# Patient Record
Sex: FEMALE | Race: WHITE | NOT HISPANIC OR LATINO
[De-identification: names, ages, dates, MRNs, and addresses within clinical notes are randomized per-mention and may not be internally consistent; named-entity substitution may affect disease eponyms.]

---

## 2019-05-30 PROBLEM — Z00.00 ENCOUNTER FOR PREVENTIVE HEALTH EXAMINATION: Status: ACTIVE | Noted: 2019-05-30

## 2019-05-31 ENCOUNTER — APPOINTMENT (OUTPATIENT)
Dept: OTOLARYNGOLOGY | Facility: CLINIC | Age: 84
End: 2019-05-31
Payer: MEDICARE

## 2019-05-31 DIAGNOSIS — E04.2 NONTOXIC MULTINODULAR GOITER: ICD-10-CM

## 2019-05-31 DIAGNOSIS — Z78.9 OTHER SPECIFIED HEALTH STATUS: ICD-10-CM

## 2019-05-31 DIAGNOSIS — Z87.891 PERSONAL HISTORY OF NICOTINE DEPENDENCE: ICD-10-CM

## 2019-05-31 PROCEDURE — 99204 OFFICE O/P NEW MOD 45 MIN: CPT

## 2019-05-31 RX ORDER — MELATONIN 10 MG
500-600 TABLET, SUBLINGUAL SUBLINGUAL
Refills: 0 | Status: ACTIVE | COMMUNITY

## 2019-05-31 RX ORDER — DOCUSATE SODIUM 100 MG/1
TABLET ORAL
Refills: 0 | Status: ACTIVE | COMMUNITY

## 2019-05-31 RX ORDER — ASPIRIN 81 MG
TABLET,CHEWABLE ORAL
Refills: 0 | Status: ACTIVE | COMMUNITY

## 2019-05-31 RX ORDER — GABAPENTIN 100 MG/1
100 CAPSULE ORAL
Refills: 0 | Status: ACTIVE | COMMUNITY

## 2019-05-31 RX ORDER — SIMVASTATIN 20 MG/1
20 TABLET, FILM COATED ORAL
Refills: 0 | Status: ACTIVE | COMMUNITY

## 2019-05-31 RX ORDER — PSYLLIUM HUSK 0.4 G
CAPSULE ORAL
Refills: 0 | Status: ACTIVE | COMMUNITY

## 2019-06-02 NOTE — CONSULT LETTER
[Dear  ___] : Dear  [unfilled], [Consult Closing:] : Thank you very much for allowing me to participate in the care of this patient.  If you have any questions, please do not hesitate to contact me. [Courtesy Letter:] : I had the pleasure of seeing your patient, [unfilled], in my office today. [DrNhan  ___] : Dr. THORPE [FreeTextEntry3] : Abad Boogie MD\par Department of Otolaryngology - Head and Neck Surgery\par [facility] [Sincerely,] : Sincerely,

## 2019-06-02 NOTE — PHYSICAL EXAM
[de-identified] : flat, soft, no masses/lesions [Midline] : trachea located in midline position [Normal] : orientation to person, place, and time: normal

## 2019-06-02 NOTE — HISTORY OF PRESENT ILLNESS
[de-identified] : 87F here for initial evaluation.\par \par Referred for evaluation after thyroid sonogram.\par She has no symptoms referable to her thyroid - there is no difficulty eating, breathing swallowing or talking, no voice change or weight loss.She is clinically euthyroid. There is no family h/o thyroid disease.\par \par Thyroid Sonogram 5/20/2019:\par R lobe: 4.4x1.4x2.1cm, mildly increased vascularity; 6mm upper pole hypoechoic module w microcalcifications; 1.6cm midpole hypoechoic nodule w microcalcifications; 1.2cm lower pole isoechoic lesion\par L lobe: 4.3x1.3x1.9cm, nodular w mildly increased vascularity; 5mm upper pole hypoechoic nodule; 1.1cm midpole hypoechoic nodule w microcalcifications; 1.1cm lower pole hypoechoic nodule; 1cm lower pole hypoechoic nodule w calcifications\par Isthmus: 0.2cm; 7mm hypoechoic right sided nodule\par No regional adenopathy\par \par ROS otherwise unremarkable.

## 2019-06-02 NOTE — REVIEW OF SYSTEMS
[As Noted in HPI] : as noted in HPI [Patient Intake Form Reviewed] : Patient intake form was reviewed [Negative] : Endocrine

## 2019-06-02 NOTE — ASSESSMENT
[FreeTextEntry1] : 87F here for initial evaluation. She was referred for evaluation after thyroid sonogram showed incidental nodules. She has no symptoms referable to her thyroid - there is no difficulty eating, breathing swallowing or talking; there is no voice change or weight loss. She is clinically euthyroid and there is no family h/o thyroid disease.\par Thyroid sonogram shows multinodular thyroid gland with multiple hypoechoic nodules >1cm with microcalcifications. Complete and comprehensive head and neck exam is otherwise unremarkable. I will send for FNA of the three suspicious nodules that meet criteria for sampling. RTO after FNA to review results. All questions answered.

## 2019-06-13 ENCOUNTER — RESULT REVIEW (OUTPATIENT)
Age: 84
End: 2019-06-13

## 2019-06-13 ENCOUNTER — APPOINTMENT (OUTPATIENT)
Dept: ULTRASOUND IMAGING | Facility: HOSPITAL | Age: 84
End: 2019-06-13
Payer: MEDICARE

## 2019-06-13 ENCOUNTER — OUTPATIENT (OUTPATIENT)
Dept: OUTPATIENT SERVICES | Facility: HOSPITAL | Age: 84
LOS: 1 days | End: 2019-06-13
Payer: MEDICARE

## 2019-06-13 PROCEDURE — 10006 FNA BX W/US GDN EA ADDL: CPT

## 2019-06-13 PROCEDURE — 88173 CYTOPATH EVAL FNA REPORT: CPT

## 2019-06-13 PROCEDURE — 10005 FNA BX W/US GDN 1ST LES: CPT

## 2019-06-13 PROCEDURE — 88305 TISSUE EXAM BY PATHOLOGIST: CPT

## 2019-06-17 LAB
NON-GYNECOLOGICAL CYTOLOGY STUDY: SIGNIFICANT CHANGE UP

## 2019-06-27 ENCOUNTER — APPOINTMENT (OUTPATIENT)
Dept: OTOLARYNGOLOGY | Facility: CLINIC | Age: 84
End: 2019-06-27

## 2021-11-18 NOTE — REASON FOR VISIT
Daily Progress Note        CODE STATUS:  Full Code    11/17/21  Assessment/Plan:  Tessa Edwards is a 68 year old female admitted on 11/16/2021.  Patient has a past medical history including anemia, sciatica, restless leg syndrome, cervical spine stenosis, GERD, fibromyalgia, osteoarthritis, vitamin D deficiency and mood disorder. She is admitted to the hospital for right colectomy after being found to have incidental finding of colon mass while being prepped for a spinal surgery.  On 11/16/2021 underwent right colectomy.     Colon mass   -- Status post laparoscopic right colectomy 11/16/2021  -- Postop care per colorectal surgery     Acute postoperative respiratory insufficiency  -- Wean O2 as able     Anemia  -- Hemoglobin currently 11.4.  Baseline appears to be 9-11 range  -- No active source of bleeding  --Leucocytosis and thrombocytopenia noted. Has a h/o atypical lymphoproliferative disorder per patient. Follows up with Dr Hill     Restless leg syndrome  -- Continue Requip     Vitamin D deficiency  --Continue vitamin D     Mood disorder  -- Continue Prozac     GERD  --Continue omeprazole     Fibromyalgia  -- Cont home meds     LOS: 1 day     Subjective:  Interval History: Patient seen and examined this morning. She reported to me that she has had a small BM this morning. Abdominal pain is better. Denies any nausea or vomiting. Tolerating full liquids.     Review of Systems:   As mentioned in subjective.    Patient Active Problem List   Diagnosis     Primary localized osteoarthrosis, lower leg     Lymphoproliferative disorder (H)     Anemia, iron deficiency     Restless legs syndrome     Abnormal CT of the abdomen     Chronic low back pain without sciatica, unspecified back pain laterality     Thyroid nodule     Splenomegaly     Weight loss     Colonic mass       Scheduled Meds:    acetaminophen  975 mg Oral Q8H     calcium carbonate-vitamin D  1 tablet Oral QPM     enoxaparin ANTICOAGULANT  40 mg  "Subcutaneous Q24H     FLUoxetine  40 mg Oral Daily     gabapentin  100 mg Oral TID     omeprazole  20 mg Oral At Bedtime     rOPINIRole  0.25 mg Oral At Bedtime     sodium chloride (PF)  3 mL Intracatheter Q8H     Continuous Infusions:    bupivacaine liposome (EXPAREL) LA inj was given in the infiltration site to produce post-op analgesia. Duration of action is up to 72 hours. Other \"itmo\" meds should not be given for 96 hours except for lidocaine 4% patch. This is for INFORMATION ONLY.       lactated ringers 75 mL/hr at 11/17/21 0642     PRN Meds:.[START ON 11/19/2021] acetaminophen, bupivacaine liposome (EXPAREL) LA inj was given in the infiltration site to produce post-op analgesia. Duration of action is up to 72 hours. Other \"timo\" meds should not be given for 96 hours except for lidocaine 4% patch. This is for INFORMATION ONLY., HYDROmorphone **OR** HYDROmorphone, lidocaine 4%, lidocaine (buffered or not buffered), naloxone **OR** naloxone **OR** naloxone **OR** naloxone, ondansetron **OR** ondansetron, sodium chloride (PF)    Objective:  Vital signs in last 24 hours:  Temp:  [97.5  F (36.4  C)-99.6  F (37.6  C)] 97.8  F (36.6  C)  Pulse:  [77-85] 80  Resp:  [16-18] 17  BP: (137-150)/(63-71) 150/71  FiO2 (%):  [2 %] 2 %  SpO2:  [80 %-95 %] 92 %        Intake/Output Summary (Last 24 hours) at 11/17/2021 1437  Last data filed at 11/17/2021 1429  Gross per 24 hour   Intake 3435 ml   Output 1465 ml   Net 1970 ml       Physical Exam:    General: Not in obvious distress.  HEENT: NC, AT   Chest: Clear to auscultation bilaterally  Heart: S1S2 normal, regular. No M/R/G  Abdomen: Soft. Expected tenderness. Bowel sounds- active.  Extremities: No legs swelling  Neuro: alert and awake, grossly non-focal      Lab Results:(I have personally reviewed the results)    Recent Results (from the past 24 hour(s))   Glucose by meter    Collection Time: 11/18/21  8:13 AM   Result Value Ref Range    GLUCOSE BY METER POCT 104 (H) 70 - " 99 mg/dL   Basic metabolic panel    Collection Time: 11/18/21 10:13 AM   Result Value Ref Range    Sodium 139 136 - 145 mmol/L    Potassium 3.7 3.5 - 5.0 mmol/L    Chloride 106 98 - 107 mmol/L    Carbon Dioxide (CO2) 25 22 - 31 mmol/L    Anion Gap 8 5 - 18 mmol/L    Urea Nitrogen 9 8 - 22 mg/dL    Creatinine 0.59 (L) 0.60 - 1.10 mg/dL    Calcium 8.8 8.5 - 10.5 mg/dL    Glucose 128 (H) 70 - 125 mg/dL    GFR Estimate >90 >60 mL/min/1.73m2   CBC with platelets    Collection Time: 11/18/21 10:13 AM   Result Value Ref Range    WBC Count 35.3 (H) 4.0 - 11.0 10e3/uL    RBC Count 3.86 3.80 - 5.20 10e6/uL    Hemoglobin 10.2 (L) 11.7 - 15.7 g/dL    Hematocrit 34.4 (L) 35.0 - 47.0 %    MCV 89 78 - 100 fL    MCH 26.4 (L) 26.5 - 33.0 pg    MCHC 29.7 (L) 31.5 - 36.5 g/dL    RDW 20.4 (H) 10.0 - 15.0 %    Platelet Count 126 (L) 150 - 450 10e3/uL       All laboratory and imaging data in the past 24 hours reviewed  Serum Glucose range:   Recent Labs   Lab 11/18/21  1013 11/18/21  0813 11/17/21  0729 11/17/21  0516   * 104* 104 98     ABG: No lab results found in last 7 days.  CBC:   Recent Labs   Lab 11/18/21  1013 11/17/21  0729 11/16/21  1031   WBC 35.3* 23.6*  --    HGB 10.2* 9.7* 11.4*   HCT 34.4* 32.6*  --    MCV 89 88  --    * 123*  --      Chemistry:   Recent Labs   Lab 11/18/21  1013 11/17/21  0729 11/16/21  1030    139 139   POTASSIUM 3.7 3.8 4.0   CHLORIDE 106 107 106   CO2 25 26 23   BUN 9 11 15   CR 0.59* 0.68 0.73   GFRESTIMATED >90 90 85   KAYLAN 8.8 8.4* 9.1     Coags:  No results for input(s): INR, PROTIME, PTT in the last 168 hours.    Invalid input(s): APTT  Cardiac Markers:  No results for input(s): CKTOTAL, TROPONINI in the last 168 hours.       XR Abdomen Port 1 View    Result Date: 11/16/2021  EXAM: XR ABDOMEN PORT 1 VIEWS LOCATION: [facility] DATE/TIME: 11/16/2021 6:28 PM INDICATION: instrument count- more instruments than what was counted COMPARISON: CT 08/30/2021      IMPRESSION: Staple line seen in the right midabdomen from recent right hemicolectomy. Clips again noted from prior cholecystectomy. No other foreign bodies. Findings called by the undersigned to the OR staff at 1850.    PET Oncology Whole Body    Result Date: 10/21/2021  Combined Report of: PET-CT on  10/21/2021 3:17 PM: 1. PET of the neck, chest, abdomen, and pelvis. 2. PET-CT Fusion for Attenuation Correction and Anatomical Localization.  3. CT of the chest, abdomen and pelvis obtained for attenuation correction. 4. 3D MIP and PET-CT fused images were processed on an independent workstation and archived to PACS and reviewed by a radiologist. Technique: 1. PET: The patient received 12.3 mCi of 18F-FDG. Serum glucose was 123 mg/dL prior to administration. Body weight was 95.7 kg. Images were evaluated in the axial, sagittal and coronal planes as well as the rotational whole body MIP. Images were acquired from the vertex to the feet. Uptake was measured at 64 minutes. BACKGROUND: Liver SUV max= 4.2; Aorta Blood SUV Max: 2.6. 2. CT: Volumetric acquisition of the chest, abdomen and pelvis acquired at 3 mm sections without intravenous contrast. The chest, abdomen and pelvis were evaluated at 5 mm sections in bone, soft tissue and lung windows.  3. 3D MIP and PET-CT fused images were processed on an independent workstation and archived to PACS and reviewed by a radiologist. INDICATION: Lymphadenopathy COMPARISON: CT dated 6/11/2021.. FINDINGS: HEAD/NECK: Right thyroid uptake measuring SUV max 11.7. Bilateral palatine tonsil uptake measuring up to SUV max 12.2. There is mild fullness of the palatine tonsils on CT images. The paranasal sinuses are clear. Mastoid air cells are clear. No masses, mass effect or pathologically enlarged lymph nodes. CHEST: Numerous prominent mildly hypermetabolic mediastinal lymph nodes are mildly increased since 6/11/2021. For example a right low paratracheal lymph node measuring 1.5 x 1.2  cm with SUV max 2.9 (series 3, image 177), previously 1.4 x 1.1 cm. Central tracheal brachial tree is patent. Heart size is normal. No pericardial effusion. No pleural effusion or pneumothorax. Multiple solid pulmonary nodules, including in the left lower lobe measuring 3 mm (series 3 image 208) is unchanged and too small to characterize by PET. New scattered subpleural groundglass opacities without significant FDG avidity, favor atelectasis. ABDOMEN AND PELVIS: Increased hazy attenuation in the central mesentery with generalized low levels of uptake. There are mesenteric and retroperitoneal lymphadenopathy demonstrating mild abnormal FDG uptake, mildly worsened since 6/11/2021. For example in the central mesentery a 1.8 x 1.6 cm lymph node with SUV max 4.3 (series 3, image 329), which previously measured 1.7 x 0.9 cm. In the inferior left peripancreatic space, there is a larger 1.7 x 0.9 cm lymph node with SUV max 3.4 (series 3, image 313), previously 1.4 x 0.9 cm. Mildly increased splenomegaly measuring 18.5 cm in craniocaudal dimension, previously 17.2 cm on 6/11/2021. There is mild generalized uptake throughout the spleen. Nonspecific FDG uptake to the bowel. For example to the cecum with SUV max 6.6 (series 5, image 380), and to the proximal ascending colon with SUV max 10.7 (series 5, image 365). No bowel obstruction. No suspicious hepatic lesions. Hepatic cyst in the right hepatic lobe measuring 1.4 cm. Cholecystectomy. No pancreas masses or ductal dilatation. Increased pulmonary blood. No adrenal nodules. No hydronephrosis or obstructing renal stones. No suspicious renal masses or cysts. The bladder is partially filled and otherwise unremarkable. LOWER EXTREMITIES: Focus of activity uptake in the subcutaneous fat overlying the right buttocks measuring SUV max 3.2. BONES: Focal uptake in the proximal left humeral head measuring SUV max 9.1 (series 3 image 121). This appears within vicinity of multiple linear  "tracts through the cortex and is favored to be postsurgical in etiology. Bilateral knee arthroplasties.     IMPRESSION: In this patient with a history of lymphadenopathy: 1. Mildly avid lymphadenopathy in the chest, abdomen, and pelvis suspicious for low-grade lymphoma such as follicular lymphoma. These lymph nodes are mildly larger since 6/11/2021. 2. Increased splenomegaly also suspicious for lymphoma. 3. Bilateral palatine tonsil hypermetabolism and tissue fullness which could be related to lymphoma versus infectious/inflammatory etiology. 4. Hypermetabolic right thyroid nodule which has increased risk for primary thyroid malignancy, or possibly lymphoma within thyroid. Recommend thyroid ultrasound and consideration of tissue sampling. 5. Multiple tiny solid pulmonary nodules are too small to characterize by PET. Attention on follow-up. 6. Multifocal bowel uptake favored to be related to physiologic from peristalsis, although bowel involvement of malignancy is a possibility. I have personally reviewed the examination and initial interpretation and I agree with the findings. ALMAS MARTINEZ MD   SYSTEM ID:  [id number]    POC US Guidance Needle Placement    Result Date: 11/16/2021  Ultrasound was performed as guidance to an anesthesia procedure.  Click \"PACS images\" hyperlink below to view any stored images.  For specific procedure details, view procedure note authored by anesthesia.      Latest radiology report personally reviewed.    Note created using dragon voice recognition software so sounds alike errors may have escaped editing.      11/18/2021   Heron Argueta MD  Hospitalist, [facility]  Pager: [phone]                " [Initial Evaluation] : an initial evaluation for [FreeTextEntry2] : thyroid

## 2021-12-23 ENCOUNTER — NON-APPOINTMENT (OUTPATIENT)
Age: 86
End: 2021-12-23